# Patient Record
Sex: MALE | Race: OTHER | Employment: FULL TIME | URBAN - METROPOLITAN AREA
[De-identification: names, ages, dates, MRNs, and addresses within clinical notes are randomized per-mention and may not be internally consistent; named-entity substitution may affect disease eponyms.]

---

## 2022-02-28 ENCOUNTER — APPOINTMENT (OUTPATIENT)
Dept: CT IMAGING | Age: 42
End: 2022-02-28
Attending: PHYSICIAN ASSISTANT

## 2022-02-28 ENCOUNTER — APPOINTMENT (OUTPATIENT)
Dept: GENERAL RADIOLOGY | Age: 42
End: 2022-02-28
Attending: PHYSICIAN ASSISTANT

## 2022-02-28 ENCOUNTER — HOSPITAL ENCOUNTER (EMERGENCY)
Age: 42
Discharge: HOME | End: 2022-02-28
Attending: EMERGENCY MEDICINE

## 2022-02-28 VITALS
HEART RATE: 82 BPM | DIASTOLIC BLOOD PRESSURE: 83 MMHG | RESPIRATION RATE: 20 BRPM | OXYGEN SATURATION: 100 % | SYSTOLIC BLOOD PRESSURE: 143 MMHG | BODY MASS INDEX: 25.83 KG/M2 | HEIGHT: 65 IN | WEIGHT: 155 LBS

## 2022-02-28 DIAGNOSIS — S30.1XXA CONTUSION OF ABDOMINAL WALL, INITIAL ENCOUNTER: ICD-10-CM

## 2022-02-28 DIAGNOSIS — S46.912A SHOULDER STRAIN, LEFT, INITIAL ENCOUNTER: ICD-10-CM
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
## 2022-02-28 LAB
ALBUMIN SERPL-MCNC: 3.8 G/DL (ref 3.5–5)
ALBUMIN/GLOB SERPL: 0.9 {RATIO} (ref 1.2–3.5)
ALP SERPL-CCNC: 89 U/L (ref 50–136)
ALT SERPL-CCNC: 36 U/L (ref 12–65)
ANION GAP SERPL CALC-SCNC: 7 MMOL/L (ref 7–16)
AST SERPL-CCNC: 26 U/L (ref 15–37)
BASOPHILS # BLD: 0.1 K/UL (ref 0–0.2)
BASOPHILS NFR BLD: 1 % (ref 0–2)
BILIRUB SERPL-MCNC: 0.5 MG/DL (ref 0.2–1.1)
BUN SERPL-MCNC: 9 MG/DL (ref 6–23)
CALCIUM SERPL-MCNC: 8.9 MG/DL (ref 8.3–10.4)
CHLORIDE SERPL-SCNC: 105 MMOL/L (ref 98–107)
CO2 SERPL-SCNC: 28 MMOL/L (ref 21–32)
CREAT SERPL-MCNC: 1.09 MG/DL (ref 0.8–1.5)
DIFFERENTIAL METHOD BLD: NORMAL
EOSINOPHIL # BLD: 0.1 K/UL (ref 0–0.8)
EOSINOPHIL NFR BLD: 2 % (ref 0.5–7.8)
ERYTHROCYTE [DISTWIDTH] IN BLOOD BY AUTOMATED COUNT: 13.4 % (ref 11.9–14.6)
GLOBULIN SER CALC-MCNC: 4.1 G/DL (ref 2.3–3.5)
GLUCOSE SERPL-MCNC: 110 MG/DL (ref 65–100)
HCT VFR BLD AUTO: 47.5 % (ref 41.1–50.3)
HGB BLD-MCNC: 15.6 G/DL (ref 13.6–17.2)
IMM GRANULOCYTES # BLD AUTO: 0 K/UL (ref 0–0.5)
IMM GRANULOCYTES NFR BLD AUTO: 1 % (ref 0–5)
LYMPHOCYTES # BLD: 1.6 K/UL (ref 0.5–4.6)
LYMPHOCYTES NFR BLD: 28 % (ref 13–44)
MCH RBC QN AUTO: 29.7 PG (ref 26.1–32.9)
MCHC RBC AUTO-ENTMCNC: 32.8 G/DL (ref 31.4–35)
MCV RBC AUTO: 90.3 FL (ref 79.6–97.8)
MONOCYTES # BLD: 0.5 K/UL (ref 0.1–1.3)
MONOCYTES NFR BLD: 9 % (ref 4–12)
NEUTS SEG # BLD: 3.6 K/UL (ref 1.7–8.2)
NEUTS SEG NFR BLD: 61 % (ref 43–78)
NRBC # BLD: 0 K/UL (ref 0–0.2)
PLATELET # BLD AUTO: 225 K/UL (ref 150–450)
PMV BLD AUTO: 11.5 FL (ref 9.4–12.3)
POTASSIUM SERPL-SCNC: 3.6 MMOL/L (ref 3.5–5.1)
PROT SERPL-MCNC: 7.9 G/DL (ref 6.3–8.2)
RBC # BLD AUTO: 5.26 M/UL (ref 4.23–5.6)
SODIUM SERPL-SCNC: 140 MMOL/L (ref 136–145)
WBC # BLD AUTO: 5.9 K/UL (ref 4.3–11.1)

## 2022-02-28 PROCEDURE — 96375 TX/PRO/DX INJ NEW DRUG ADDON: CPT

## 2022-02-28 PROCEDURE — 73030 X-RAY EXAM OF SHOULDER: CPT

## 2022-02-28 PROCEDURE — 99285 EMERGENCY DEPT VISIT HI MDM: CPT

## 2022-02-28 PROCEDURE — 74011000636 HC RX REV CODE- 636: Performed by: EMERGENCY MEDICINE

## 2022-02-28 PROCEDURE — 71260 CT THORAX DX C+: CPT

## 2022-02-28 PROCEDURE — 85025 COMPLETE CBC W/AUTO DIFF WBC: CPT

## 2022-02-28 PROCEDURE — 80053 COMPREHEN METABOLIC PANEL: CPT

## 2022-02-28 PROCEDURE — 74011000258 HC RX REV CODE- 258: Performed by: EMERGENCY MEDICINE

## 2022-02-28 PROCEDURE — 81003 URINALYSIS AUTO W/O SCOPE: CPT

## 2022-02-28 PROCEDURE — 96374 THER/PROPH/DIAG INJ IV PUSH: CPT

## 2022-02-28 PROCEDURE — 74011250636 HC RX REV CODE- 250/636: Performed by: PHYSICIAN ASSISTANT

## 2022-02-28 RX ORDER — CYCLOBENZAPRINE HCL 5 MG
5 TABLET ORAL
Qty: 20 TABLET | Refills: 0 | Status: SHIPPED | OUTPATIENT
Start: 2022-02-28 | End: 2022-03-22

## 2022-02-28 RX ORDER — MORPHINE SULFATE 2 MG/ML
2 INJECTION, SOLUTION INTRAMUSCULAR; INTRAVENOUS
Status: COMPLETED | OUTPATIENT
Start: 2022-02-28 | End: 2022-02-28

## 2022-02-28 RX ORDER — ONDANSETRON 2 MG/ML
4 INJECTION INTRAMUSCULAR; INTRAVENOUS
Status: COMPLETED | OUTPATIENT
Start: 2022-02-28 | End: 2022-02-28

## 2022-02-28 RX ORDER — CYCLOBENZAPRINE HCL 5 MG
5 TABLET ORAL
Qty: 20 TABLET | Refills: 0 | Status: SHIPPED | OUTPATIENT
Start: 2022-02-28 | End: 2022-02-28 | Stop reason: SDUPTHER

## 2022-02-28 RX ORDER — DICLOFENAC POTASSIUM 50 MG/1
50 TABLET, FILM COATED ORAL
Qty: 30 TABLET | Refills: 0 | Status: SHIPPED | OUTPATIENT
Start: 2022-02-28 | End: 2022-03-22

## 2022-02-28 RX ORDER — SODIUM CHLORIDE 0.9 % (FLUSH) 0.9 %
10 SYRINGE (ML) INJECTION
Status: COMPLETED | OUTPATIENT
Start: 2022-02-28 | End: 2022-02-28

## 2022-02-28 RX ORDER — DICLOFENAC POTASSIUM 50 MG/1
50 TABLET, FILM COATED ORAL
Qty: 30 TABLET | Refills: 0 | Status: SHIPPED | OUTPATIENT
Start: 2022-02-28 | End: 2022-02-28 | Stop reason: SDUPTHER

## 2022-02-28 RX ADMIN — Medication 10 ML: at 12:11

## 2022-02-28 RX ADMIN — MORPHINE SULFATE 2 MG: 2 INJECTION, SOLUTION INTRAMUSCULAR; INTRAVENOUS at 11:03

## 2022-02-28 RX ADMIN — ONDANSETRON 4 MG: 2 INJECTION INTRAMUSCULAR; INTRAVENOUS at 11:03

## 2022-02-28 RX ADMIN — IOPAMIDOL 100 ML: 755 INJECTION, SOLUTION INTRAVENOUS at 12:11

## 2022-02-28 RX ADMIN — SODIUM CHLORIDE 100 ML: 9 INJECTION, SOLUTION INTRAVENOUS at 12:11

## 2022-02-28 NOTE — Clinical Note
[address] [address] EMERGENCY DEPT  300 [facility] [address] [phone]    Work/School Note    Date: 2/28/2022    To Whom It May concern:    Dax Inman was seen and treated today in the emergency room by the following provider(s):  Attending Provider: Mary Camacho DO  Physician Assistant: BETTINA Carlisle. Dax Inman is excused from work/school on 2/28/2022 through 3/2/2022. He is medically clear to return to work/school on 3/3/2022.          Sincerely,          Tammy Montanez RN
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
## 2022-02-28 NOTE — DISCHARGE INSTRUCTIONS
Rest, ice to shoulder, elevate, avoid painful activities. ED if worse. Follow up with Chiropractor and Ortho for recheck.

## 2022-02-28 NOTE — Clinical Note
[address] [address] EMERGENCY DEPT  300 [facility] [address]  [phone]    Work/School Note    Date: 2/28/2022    To Whom It May concern:    Racquel Lewis was seen and treated today in the emergency room by the following provider(s):  Attending Provider: Radha Galdamez DO  Physician Assistant: BETTINA Soto. Racquel Lewis is excused from work/school on 2/28/2022 through 3/2/2022. He is medically clear to return to work/school on 3/3/2022.          Sincerely,          Shelly Miranda RN

## 2022-02-28 NOTE — ED NOTES
I have reviewed discharge instructions with the patient. The patient verbalized understanding. Patient left ED via Discharge Method: ambulatory to Home with family. Opportunity for questions and clarification provided. Patient given 2 scripts. To continue your aftercare when you leave the hospital, you may receive an automated call from our care team to check in on how you are doing. This is a free service and part of our promise to provide the best care and service to meet your aftercare needs.  If you have questions, or wish to unsubscribe from this service please call [phone]. Thank you for Choosing our [address] [place] [address] Emergency Department.
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
## 2022-02-28 NOTE — Clinical Note
[facility] EMERGENCY DEPT  [address] [facility] [address] [phone]    Work/School Note    Date: 2/28/2022    To Whom It May concern:    Madison Luque was seen and treated today in the emergency room by the following provider(s):  Attending Provider: Gricel Bocanegra DO  Physician Assistant: BETTINA Fields. Madison Luque is excused from work/school on 2/28/2022 through 3/2/2022. He is medically clear to return to work/school on 3/3/2022.          Sincerely,          BETTINA Perez

## 2022-03-22 ENCOUNTER — HOSPITAL ENCOUNTER (EMERGENCY)
Age: 42
Discharge: HOME | End: 2022-03-22
Attending: EMERGENCY MEDICINE

## 2022-03-22 VITALS
BODY MASS INDEX: 26.87 KG/M2 | HEART RATE: 85 BPM | WEIGHT: 146 LBS | HEIGHT: 62 IN | OXYGEN SATURATION: 98 % | RESPIRATION RATE: 16 BRPM | TEMPERATURE: 98.3 F | DIASTOLIC BLOOD PRESSURE: 94 MMHG | SYSTOLIC BLOOD PRESSURE: 172 MMHG

## 2022-03-22 DIAGNOSIS — S01.01XA LACERATION OF SCALP, INITIAL ENCOUNTER: Primary | ICD-10-CM

## 2022-03-22 PROCEDURE — 90471 IMMUNIZATION ADMIN: CPT

## 2022-03-22 PROCEDURE — 75810000293 HC SIMP/SUPERF WND  RPR

## 2022-03-22 PROCEDURE — 90715 TDAP VACCINE 7 YRS/> IM: CPT | Performed by: PHYSICIAN ASSISTANT

## 2022-03-22 PROCEDURE — 99284 EMERGENCY DEPT VISIT MOD MDM: CPT

## 2022-03-22 PROCEDURE — 74011250636 HC RX REV CODE- 250/636: Performed by: PHYSICIAN ASSISTANT

## 2022-03-22 RX ADMIN — TETANUS TOXOID, REDUCED DIPHTHERIA TOXOID AND ACELLULAR PERTUSSIS VACCINE, ADSORBED 0.5 ML: 5; 2.5; 8; 8; 2.5 SUSPENSION INTRAMUSCULAR at 20:52

## 2022-03-22 NOTE — ED TRIAGE NOTES
Pt presents to ED after wood fell on his head. Pt has laceration to the back of the head. Bleeding controlled. Denies LOC, vomiting or vision changes. Masked.

## 2022-03-23 NOTE — DISCHARGE INSTRUCTIONS
Keep the wound clean and dry, Then keep wound open to air, if you do anything that may cause dirt to enter the wound, please cover the area with a dressing beforehand. Augustus Jacquie may be removed in approximately 10 days. You may have sutures taken out here or with the primary doctor. Monitor closely for signs of infection, return for emergent or severely worsening symptoms. Mantenga la herida limpia y seca, Sheri Limes retirarlo y limpiar el área suavemente con agua tibia y Donnie. Luego, mantenga la herida abierta al aire, si hace algo que pueda causar que entre suciedad en la herida, [place] el área con un vendaje de STAVANGER. Las grapas se pueden quitar en aproximadamente 10 días. Es posible que le quiten las suturas aquí o con el médico de [place]. Supervise de cerca los signos de infección, regrese si hay síntomas emergentes o que empeoran gravemente.

## 2022-03-23 NOTE — ED PROVIDER NOTES
49-year-old male comes to the ER with his wife for evaluation of a laceration to the posterior scalp. He says that a piece of wood fell and hit him in the back of the head. This occurred at approximately 6:30 PM.  His tetanus shot is not up-to-date. He denies loss of consciousness or any other injury. He says that he is not having a headache or dizziness and he has not had any episodes of vomiting, the only reason that he came in today was to get the wound checked out to make sure it is not become infected. History reviewed. No pertinent past medical history. No past surgical history on file. History reviewed. No pertinent family history. Social History     Socioeconomic History    Marital status:      Spouse name: Not on file    Number of children: Not on file    Years of education: Not on file    Highest education level: Not on file   Occupational History    Not on file   Tobacco Use    Smoking status: Not on file    Smokeless tobacco: Not on file   Substance and Sexual Activity    Alcohol use: Not on file    Drug use: Not on file    Sexual activity: Not on file   Other Topics Concern    Not on file   Social History Narrative    Not on file     Social Determinants of Health     Financial Resource Strain:     Difficulty of Paying Living Expenses: Not on file   Food Insecurity:     Worried About Running Out of Food in the Last Year: Not on file    Chris of Food in the Last Year: Not on file   Transportation Needs:     Lack of Transportation (Medical): Not on file    Lack of Transportation (Non-Medical):  Not on file   Physical Activity:     Days of Exercise per Week: Not on file    Minutes of Exercise per Session: Not on file   Stress:     Feeling of Stress : Not on file   Social Connections:     Frequency of Communication with Friends and Family: Not on file    Frequency of Social Gatherings with Friends and Family: Not on file    Attends Yarsanism Services: Not on file    Active Member of Clubs or Organizations: Not on file    Attends Club or Organization Meetings: Not on file    Marital Status: Not on file   Intimate Partner Violence:     Fear of Current or Ex-Partner: Not on file    Emotionally Abused: Not on file    Physically Abused: Not on file    Sexually Abused: Not on file   Housing Stability:     Unable to Pay for Housing in the Last Year: Not on file    Number of [place] in the Last Year: Not on file    Unstable Housing in the Last Year: Not on file         ALLERGIES: Patient has no known allergies. Review of Systems   Constitutional: Negative for activity change. Eyes: Negative for visual disturbance. Respiratory: Negative for cough and shortness of breath. Cardiovascular: Negative for chest pain. Gastrointestinal: Negative for abdominal pain. Musculoskeletal: Negative for back pain and neck stiffness. Skin: Positive for wound. Negative for rash. Neurological: Negative for dizziness, speech difficulty, numbness and headaches. All other systems reviewed and are negative. Vitals:    03/22/22 2001 03/22/22 2016   BP: (!) 172/94    Pulse: 85    Resp: 16    Temp: 98.3 °F (36.8 °C)    SpO2: 98% 98%   Weight: 66.2 kg (146 lb)    Height: 5' 2\" (1.575 m)             Physical Exam  Vitals and nursing note reviewed. Constitutional:       Appearance: Normal appearance. HENT:      Head: Normocephalic. Laceration present. No raccoon eyes, Barboza's sign, abrasion, contusion, masses, right periorbital erythema or left periorbital erythema. Hair is normal.        Comments: 3cm linear laceration to the posterior scalp     Nose: Nose normal.      Mouth/Throat:      Mouth: Mucous membranes are moist.   Eyes:      General: Lids are normal.      Extraocular Movements: Extraocular movements intact. Conjunctiva/sclera: Conjunctivae normal.      Pupils: Pupils are equal, round, and reactive to light.    Cardiovascular:      Rate and Rhythm: Normal rate and regular rhythm. Pulmonary:      Effort: Pulmonary effort is normal.      Breath sounds: Normal breath sounds. Abdominal:      General: Abdomen is flat. Palpations: Abdomen is soft. Tenderness: There is no abdominal tenderness. Skin:     General: Skin is warm and dry. Neurological:      Mental Status: He is alert. MDM  Number of Diagnoses or Management Options  Diagnosis management comments: Laceration repaired with staples, patient tolerated procedure well. Will discharge with wound care instructions given and have the patient follow-up with the PCP or return for staple removal.  Tdap was updated today.          Procedures

## 2022-03-23 NOTE — ED NOTES
I have reviewed discharge instructions with the patient. The patient verbalized understanding. Patient left ED via Discharge Method: ambulatory to Home with family   Opportunity for questions and clarification provided. Patient given 0 scripts. To continue your aftercare when you leave the hospital, you may receive an automated call from our care team to check in on how you are doing. This is a free service and part of our promise to provide the best care and service to meet your aftercare needs.  If you have questions, or wish to unsubscribe from this service please call [phone]. Thank you for Choosing our [facility] Emergency Department.

## 2022-04-05 ENCOUNTER — HOSPITAL ENCOUNTER (EMERGENCY)
Age: 42
Discharge: HOME | End: 2022-04-05
Attending: EMERGENCY MEDICINE

## 2022-04-05 VITALS
WEIGHT: 146 LBS | DIASTOLIC BLOOD PRESSURE: 86 MMHG | RESPIRATION RATE: 17 BRPM | HEART RATE: 64 BPM | TEMPERATURE: 97.7 F | SYSTOLIC BLOOD PRESSURE: 147 MMHG | OXYGEN SATURATION: 98 % | BODY MASS INDEX: 28.66 KG/M2 | HEIGHT: 60 IN

## 2022-04-05 DIAGNOSIS — Z48.02 ENCOUNTER FOR STAPLE REMOVAL: Primary | ICD-10-CM

## 2022-04-05 PROCEDURE — 75810000275 HC EMERGENCY DEPT VISIT NO LEVEL OF CARE

## 2022-04-05 NOTE — ED NOTES
I have reviewed discharge instructions with the patient. The patient verbalized understanding. Patient left ED via Discharge Method: ambulatory to Home. Opportunity for questions and clarification provided. Patient given 0 scripts. To continue your aftercare when you leave the hospital, you may receive an automated call from our care team to check in on how you are doing. This is a free service and part of our promise to provide the best care and service to meet your aftercare needs.  If you have questions, or wish to unsubscribe from this service please call [phone]. Thank you for Choosing our [facility] Emergency Department.

## 2022-04-05 NOTE — ED PROVIDER NOTES
Patient is here for staple removal from the left posterior scalp. Denies any complaints today. No past medical history on file. No past surgical history on file. No family history on file. Social History     Socioeconomic History    Marital status:      Spouse name: Not on file    Number of children: Not on file    Years of education: Not on file    Highest education level: Not on file   Occupational History    Not on file   Tobacco Use    Smoking status: Not on file    Smokeless tobacco: Not on file   Substance and Sexual Activity    Alcohol use: Not on file    Drug use: Not on file    Sexual activity: Not on file   Other Topics Concern    Not on file   Social History Narrative    Not on file     Social Determinants of Health     Financial Resource Strain:     Difficulty of Paying Living Expenses: Not on file   Food Insecurity:     Worried About Running Out of Food in the Last Year: Not on file    Chris of Food in the Last Year: Not on file   Transportation Needs:     Lack of Transportation (Medical): Not on file    Lack of Transportation (Non-Medical):  Not on file   Physical Activity:     Days of Exercise per Week: Not on file    Minutes of Exercise per Session: Not on file   Stress:     Feeling of Stress : Not on file   Social Connections:     Frequency of Communication with Friends and Family: Not on file    Frequency of Social Gatherings with Friends and Family: Not on file    Attends Congregation Services: Not on file    Active Member of Clubs or Organizations: Not on file    Attends Club or Organization Meetings: Not on file    Marital Status: Not on file   Intimate Partner Violence:     Fear of Current or Ex-Partner: Not on file    Emotionally Abused: Not on file    Physically Abused: Not on file    Sexually Abused: Not on file   Housing Stability:     Unable to Pay for Housing in the Last Year: Not on file    Number of [place] in the Last Year: Not on file    Unstable Housing in the Last Year: Not on file         ALLERGIES: Patient has no known allergies. Review of Systems   Constitutional: Negative for activity change and fever. Skin: Positive for wound. Negative for color change and rash. All other systems reviewed and are negative. Vitals:    04/05/22 1745   BP: (!) 147/86   Pulse: 64   Resp: 17   Temp: 97.7 °F (36.5 °C)   SpO2: 98%   Weight: 66.2 kg (146 lb)   Height: 5' (1.524 m)            Physical Exam  Vitals reviewed. Constitutional:       Appearance: Normal appearance. HENT:      Head: Normocephalic and atraumatic. Eyes:      Conjunctiva/sclera: Conjunctivae normal.   Cardiovascular:      Rate and Rhythm: Normal rate and regular rhythm. Pulmonary:      Effort: Pulmonary effort is normal.      Breath sounds: Normal breath sounds. Skin:     General: Skin is warm and dry. Comments: 4 staples intact left posterior scalp, no signs of wound infection. Skin is well approximated   Neurological:      Mental Status: He is alert. MDM  Number of Diagnoses or Management Options  Encounter for staple removal: new and requires workup  Diagnosis management comments: Staples removed.          Procedures